# Patient Record
Sex: FEMALE | Race: WHITE | NOT HISPANIC OR LATINO | URBAN - METROPOLITAN AREA
[De-identification: names, ages, dates, MRNs, and addresses within clinical notes are randomized per-mention and may not be internally consistent; named-entity substitution may affect disease eponyms.]

---

## 2023-10-24 ENCOUNTER — TELEPHONE (OUTPATIENT)
Dept: PLASTIC SURGERY | Facility: CLINIC | Age: 63
End: 2023-10-24
Payer: COMMERCIAL

## 2023-10-26 NOTE — TELEPHONE ENCOUNTER
Called pt today to discuss appt for lipedema. Pt did not answer but I was able to leave a VM with my direct call back number to discuss.  Luke MITTAL RN

## 2023-11-02 NOTE — TELEPHONE ENCOUNTER
FUTURE VISIT INFORMATION      FUTURE VISIT INFORMATION:  Date: 1/3/24  Time: 11:00am  [place] [facility] [place]  REFERRAL INFORMATION:  Referring provider:  Dr Kianna Bledsoe   Referring providers clinic:  [facility]  Reason for visit/diagnosis  Discuss lipedema     RECORDS REQUESTED FROM:       Clinic name Comments Records Status Imaging Status   [facility] Telemedicine visit/referral 10/24/23 EPIC

## 2024-01-03 ENCOUNTER — TELEPHONE (OUTPATIENT)
Dept: PLASTIC SURGERY | Facility: CLINIC | Age: 64
End: 2024-01-03

## 2024-01-03 ENCOUNTER — OFFICE VISIT (OUTPATIENT)
Dept: PLASTIC SURGERY | Facility: CLINIC | Age: 64
End: 2024-01-03
Payer: COMMERCIAL

## 2024-01-03 ENCOUNTER — PRE VISIT (OUTPATIENT)
Dept: PLASTIC SURGERY | Facility: CLINIC | Age: 64
End: 2024-01-03

## 2024-01-03 VITALS
OXYGEN SATURATION: 96 % | WEIGHT: 194.3 LBS | HEIGHT: 66 IN | SYSTOLIC BLOOD PRESSURE: 151 MMHG | DIASTOLIC BLOOD PRESSURE: 85 MMHG | HEART RATE: 71 BPM | BODY MASS INDEX: 31.23 KG/M2

## 2024-01-03 DIAGNOSIS — R60.9 LIPEDEMA: Primary | ICD-10-CM

## 2024-01-03 PROCEDURE — 99203 OFFICE O/P NEW LOW 30 MIN: CPT | Performed by: STUDENT IN AN ORGANIZED HEALTH CARE EDUCATION/TRAINING PROGRAM

## 2024-01-03 RX ORDER — DIPHENOXYLATE HYDROCHLORIDE AND ATROPINE SULFATE 2.5; .025 MG/1; MG/1
1 TABLET ORAL DAILY
COMMUNITY

## 2024-01-03 RX ORDER — TRAZODONE HYDROCHLORIDE 50 MG/1
50-100 TABLET, FILM COATED ORAL
COMMUNITY
Start: 2023-10-24

## 2024-01-03 RX ORDER — MIRABEGRON 25 MG/1
1 TABLET, FILM COATED, EXTENDED RELEASE ORAL DAILY
COMMUNITY
Start: 2023-12-13 | End: 2024-08-06

## 2024-01-03 RX ORDER — BUPROPION HYDROCHLORIDE 150 MG/1
1 TABLET ORAL DAILY
COMMUNITY
Start: 2023-02-21 | End: 2024-08-13

## 2024-01-03 RX ORDER — HYDROCHLOROTHIAZIDE 12.5 MG/1
CAPSULE ORAL
COMMUNITY
Start: 2023-03-07 | End: 2024-08-06

## 2024-01-03 RX ORDER — LOSARTAN POTASSIUM AND HYDROCHLOROTHIAZIDE 12.5; 5 MG/1; MG/1
1 TABLET ORAL DAILY
COMMUNITY
Start: 2023-03-06 | End: 2024-08-13

## 2024-01-03 ASSESSMENT — PAIN SCALES - GENERAL: PAINLEVEL: NO PAIN (0)

## 2024-01-03 NOTE — LETTER
"1/3/2024       RE: Waleska Taylor  [address]  [place] [address]     Dear Colleague,    Thank you for referring your patient, Waleska Taylor, to the [facility] PLASTIC AND RECONSTRUCTIVE SURGERY CLINIC [place] at [facility]. Please see a copy of my visit note below.    PRS    HPI: 63-year-old female presenting with bilateral lower extremity lipedema.  Patient has always had heaviness and achiness to bilateral lower extremities below the knees.  She has tried elevation, compression stockings, and most recently lymphatic pneumatic compression boots.  She was diagnosed with lipedema by a dermatologist on May 2023.  Since then, she has tried the static compression stockings with little to no relief.  Over the last month, patient has tried the lymphatic pneumatic compression boots, which she uses for 30 minutes/day.  She states that this seems to be helping, but it is still very early.  Ultrasound imaging reveals no DVT.  Patient had varicose vein ablative procedure 15+ years ago.  No lymphatic imaging.    ROS: Negative, see HPI  Past medical history: Nondiabetic  Past surgical history: As above  Medications: No blood thinners  Allergies: None  Family history: No bleeding or clotting problems, issues with anesthesia  Social history: Non-smoker, denies any tobacco or nicotine use    Examination:  BP (!) 151/85 (BP Location: Left arm, Patient Position: Sitting, Cuff Size: Adult Regular)   Pulse 71   Ht 1.676 m (5' 6\")   Wt 88.1 kg (194 lb 4.8 oz)   SpO2 96%   BMI 31.36 kg/m    Nonlabored breathing  Not distressed  Bilateral below the knee lipodystrophy, varicosity, and some edema that is nonpitting  No bilateral calf tenderness  Minimal edema of the thighs     A/P: 63-year-old female presenting with bilateral lower extremity primarily below the knee lipodystrophy versus lipedema    -Discussed the options for management.  Since the " patient has just tried lymphatic pneumatic compression boots with some relief, my suggestion would be to continue this conservative treatment for at least another 3-6 months to truly evaluate its efficacy.  Additionally, patient should inquire with insurance regarding possibility of coverage for lipedema.  Discussed possibility of performing liposuction.  Explained that I have some experience with this with patient's expressing relief after liposuction.  Patient is agreeable to plan.  -Return to clinic after at least 3-6 months of additional conservative treatment  -A total of 45 minutes was devoted to review of chart, direct face-to-face patient counseling and documentation during this encounter, exclusive of any procedure performed.        Again, thank you for allowing me to participate in the care of your patient.      Sincerely,    Salvador Gonzalez MD

## 2024-01-03 NOTE — NURSING NOTE
"Chief Complaint   Patient presents with    Consult     Discuss lipedema, per patient.       Vitals:    01/03/24 1354   BP: (!) 151/85   BP Location: Left arm   Patient Position: Sitting   Cuff Size: Adult Regular   Pulse: 71   SpO2: 96%   Weight: 194 lb 4.8 oz   Height: 5' 6\"       Body mass index is 31.36 kg/m .    Merlin Larios, EMT    "

## 2024-01-03 NOTE — TELEPHONE ENCOUNTER
[facility] Call Center    Phone Message    May a detailed message be left on voicemail: yes     Reason for Call: Other: Please call patient back to discuss paperwork needed in order for insurance to cover the cost of surgery.     Action Taken: Message routed to:  [facility]     Travel Screening: Not Applicable

## 2024-01-03 NOTE — PROGRESS NOTES
"PRS    HPI: 63-year-old female presenting with bilateral lower extremity lipedema.  Patient has always had heaviness and achiness to bilateral lower extremities below the knees.  She has tried elevation, compression stockings, and most recently lymphatic pneumatic compression boots.  She was diagnosed with lipedema by a dermatologist on May 2023.  Since then, she has tried the static compression stockings with little to no relief.  Over the last month, patient has tried the lymphatic pneumatic compression boots, which she uses for 30 minutes/day.  She states that this seems to be helping, but it is still very early.  Ultrasound imaging reveals no DVT.  Patient had varicose vein ablative procedure 15+ years ago.  No lymphatic imaging.    ROS: Negative, see HPI  Past medical history: Nondiabetic  Past surgical history: As above  Medications: No blood thinners  Allergies: None  Family history: No bleeding or clotting problems, issues with anesthesia  Social history: Non-smoker, denies any tobacco or nicotine use    Examination:  BP (!) 151/85 (BP Location: Left arm, Patient Position: Sitting, Cuff Size: Adult Regular)   Pulse 71   Ht 1.676 m (5' 6\")   Wt 88.1 kg (194 lb 4.8 oz)   SpO2 96%   BMI 31.36 kg/m    Nonlabored breathing  Not distressed  Bilateral below the knee lipodystrophy, varicosity, and some edema that is nonpitting  No bilateral calf tenderness  Minimal edema of the thighs     A/P: 63-year-old female presenting with bilateral lower extremity primarily below the knee lipodystrophy versus lipedema    -Discussed the options for management.  Since the patient has just tried lymphatic pneumatic compression boots with some relief, my suggestion would be to continue this conservative treatment for at least another 3-6 months to truly evaluate its efficacy.  Additionally, patient should inquire with insurance regarding possibility of coverage for lipedema.  Discussed possibility of performing liposuction.  " Explained that I have some experience with this with patient's expressing relief after liposuction.  Patient is agreeable to plan.  -Return to clinic after at least 3-6 months of additional conservative treatment  -A total of 45 minutes was devoted to review of chart, direct face-to-face patient counseling and documentation during this encounter, exclusive of any procedure performed.    Salvador Gonzalez MD, PhD

## 2024-01-05 NOTE — TELEPHONE ENCOUNTER
Returned pt's call today to discuss needed paperwork. Pt did not answer but I was able to leave a voicemail with my direct call back number.  Luke MITTAL RN

## 2024-01-28 ENCOUNTER — HEALTH MAINTENANCE LETTER (OUTPATIENT)
Age: 64
End: 2024-01-28

## 2024-03-27 ENCOUNTER — OFFICE VISIT (OUTPATIENT)
Dept: PLASTIC SURGERY | Facility: CLINIC | Age: 64
End: 2024-03-27
Payer: COMMERCIAL

## 2024-03-27 VITALS
DIASTOLIC BLOOD PRESSURE: 63 MMHG | BODY MASS INDEX: 31.16 KG/M2 | WEIGHT: 193.9 LBS | SYSTOLIC BLOOD PRESSURE: 117 MMHG | HEIGHT: 66 IN | HEART RATE: 79 BPM | OXYGEN SATURATION: 99 %

## 2024-03-27 DIAGNOSIS — R60.9 LIPEDEMA: Primary | ICD-10-CM

## 2024-03-27 PROCEDURE — 99213 OFFICE O/P EST LOW 20 MIN: CPT | Performed by: STUDENT IN AN ORGANIZED HEALTH CARE EDUCATION/TRAINING PROGRAM

## 2024-03-27 RX ORDER — ENOXAPARIN SODIUM 100 MG/ML
40 INJECTION SUBCUTANEOUS
Status: CANCELLED | OUTPATIENT
Start: 2024-03-27

## 2024-03-27 RX ORDER — CEFAZOLIN SODIUM 2 G/50ML
2 SOLUTION INTRAVENOUS SEE ADMIN INSTRUCTIONS
Status: CANCELLED | OUTPATIENT
Start: 2024-03-27

## 2024-03-27 RX ORDER — CEFAZOLIN SODIUM 2 G/50ML
2 SOLUTION INTRAVENOUS
Status: CANCELLED | OUTPATIENT
Start: 2024-03-27

## 2024-03-27 ASSESSMENT — PAIN SCALES - GENERAL: PAINLEVEL: NO PAIN (0)

## 2024-03-27 NOTE — LETTER
3/27/2024       RE: Waleska Taylor  [address] [address]     Dear Colleague,    Thank you for referring your patient, Waleska Taylor, to the [facility] PLASTIC AND RECONSTRUCTIVE SURGERY CLINIC [place] at [facility]. Please see a copy of my visit note below.    PRS    Patient returns after additional trial of conservative management with decongestive compression therapy and compression stockings with no relief. She sought additional evaluation from internist and has more documentation to support medical necessity treatment of lipedema. She would like surgery.     Discussed the limitations of liposuction. First, the telangiectasias will not be treated by liposuction. This could be treated with laser or light therapies. Second, liposuction may cause excess skin necessitating possible future elective excisional procedure like medial thigh lift. Third, liposuction requires wetting fluid infiltration and may disrupt veins and/or lymphatics, so it may cause prolonged swelling or even worse lipedema. However, I have done this on one patient for lipedema and it has helped her with heaviness symptoms characteristic of lipedema. We would plan to start prone and then flip supine.     Discussed the risks of surgery, including but not limited to: infection, bleeding, pain, poor scarring, prolonged or worse swelling, incomplete relief of symptoms, injury to deeper structures, neuroma formation, foot drop, contour irregularity, asymmetries, suboptimal aesthetic result, anesthesia-related complication, DVT/PE, death. Despite these risks, patient consents to BILATERAL circumferential lower extremity liposuction for lipedema. All questions answered.     Case request placed. Patient understands the postoperative protocol of compression for 4 weeks and low salt diet. No cardio for 2 weeks. She can return to work in 1-2 weeks.     A total of 20 minutes was  devoted to review of chart, direct face-to-face patient counseling and documentation during this encounter, exclusive of any procedure performed.          Again, thank you for allowing me to participate in the care of your patient.      Sincerely,    Salvador Gonzalez MD

## 2024-03-27 NOTE — NURSING NOTE
"Chief Complaint   Patient presents with    RECHECK     Follow-up, lipedema.       Vitals:    03/27/24 1314   BP: 117/63   BP Location: Left arm   Patient Position: Sitting   Cuff Size: Adult Large   Pulse: 79   SpO2: 99%   Weight: 88 kg (193 lb 14.4 oz)   Height: 1.676 m (5' 6\")       Body mass index is 31.3 kg/m .    Merlin Larios, EMT    "

## 2024-03-27 NOTE — PROGRESS NOTES
PRS    Patient returns after additional trial of conservative management with decongestive compression therapy and compression stockings with no relief. She sought additional evaluation from internist and has more documentation to support medical necessity treatment of lipedema. She would like surgery.     Discussed the limitations of liposuction. First, the telangiectasias will not be treated by liposuction. This could be treated with laser or light therapies. Second, liposuction may cause excess skin necessitating possible future elective excisional procedure like medial thigh lift. Third, liposuction requires wetting fluid infiltration and may disrupt veins and/or lymphatics, so it may cause prolonged swelling or even worse lipedema. However, I have done this on one patient for lipedema and it has helped her with heaviness symptoms characteristic of lipedema. We would plan to start prone and then flip supine.     Discussed the risks of surgery, including but not limited to: infection, bleeding, pain, poor scarring, prolonged or worse swelling, incomplete relief of symptoms, injury to deeper structures, neuroma formation, foot drop, contour irregularity, asymmetries, suboptimal aesthetic result, anesthesia-related complication, DVT/PE, death. Despite these risks, patient consents to BILATERAL circumferential lower extremity liposuction for lipedema. All questions answered.     Case request placed. Patient understands the postoperative protocol of compression for 4 weeks and low salt diet. No cardio for 2 weeks. She can return to work in 1-2 weeks.     A total of 20 minutes was devoted to review of chart, direct face-to-face patient counseling and documentation during this encounter, exclusive of any procedure performed.    Salvador Gonzalez MD, PhD

## 2024-04-08 ENCOUNTER — TELEPHONE (OUTPATIENT)
Dept: PLASTIC SURGERY | Facility: CLINIC | Age: 64
End: 2024-04-08
Payer: COMMERCIAL

## 2024-04-08 PROBLEM — R60.9 LIPEDEMA: Status: ACTIVE | Noted: 2024-03-27

## 2024-04-08 NOTE — TELEPHONE ENCOUNTER
Attempted to call patient on 4/8 regarding scheduling surgery with Dr. Gonzalez    Was unable to leave voicemail due to no voicemail option    [employer] sent, provided direct number to discuss  __    Shruthi Newberryhiphil on 4/8/2024 at 2:39 PM  P: [phone]

## 2024-04-08 NOTE — TELEPHONE ENCOUNTER
RN Care Coordinator: Luke Karimi    Surgery is scheduled with Dr. Gonzalez  Date: 8/29   *mid/late August dates offered due to note in chart that patient must wait at least 3 months after breast surgery scheduled on 5/10 outside of the [facility]  [facility]    H&P to be completed by: Primary Care team - patient instructed to schedule   per patient, this will be scheduled with Kianna Bledsoe MD     Surgical consult: Declined - patient does not feel she needs this at this time     Post-op visit: 9/11 with Dr. Gonzalez, [facility]    Patient will receive a phone call from pre-admission nurses 1-2 days prior to surgery with arrival time and NPO instructions.       Spoke with the patient and was able to confirm all scheduled information.       Patient questions/concerns: Patient is requesting surgery on 8/15 or 8/1. Will see if there is a possibility of moving surgery to 8/15. Patient unavailable for first date offered due to work, which was 8/22. Explained that the length of her surgery is 4 hours, which means we need more than 4 hours of time available in the operating room. Also explained that time is assigned to other specialties, so it may not become available until 2 weeks before the date.    Explained financial securing process in detail, including timeframe of which the financial securing team submits surgery to insurance. Also explained what communication she will and will not receive from our team.    Also recommended that ahead of surgery, patient does the following:  Check to see if there is medical policy or specific criteria required by her insurance for this surgery to be covered       Surgery packet: to be sent via [place] mail    __    Shruthi Dent on 4/8/2024 at 4:50 PM  P: [phone]

## 2024-06-07 ENCOUNTER — TELEPHONE (OUTPATIENT)
Dept: PLASTIC SURGERY | Facility: CLINIC | Age: 64
End: 2024-06-07
Payer: COMMERCIAL

## 2024-06-07 NOTE — TELEPHONE ENCOUNTER
Left message regarding scheduling surgery/procedure with Dr. Gonzalez. Writer left call back number on the patients voicemail.      Shruthi Velasco on 6/7/2024 at 11:09 AM   P: [phone]

## 2024-06-10 NOTE — TELEPHONE ENCOUNTER
Left message regarding scheduling surgery/procedure with Dr. Gonzalez. Writer left call back number on the patients voicemail.      Shruthi Velasco on 6/10/2024 at 10:39 AM   P: [phone]

## 2024-06-11 ENCOUNTER — MYC MEDICAL ADVICE (OUTPATIENT)
Dept: PLASTIC SURGERY | Facility: CLINIC | Age: 64
End: 2024-06-11
Payer: COMMERCIAL

## 2024-06-11 NOTE — TELEPHONE ENCOUNTER
Attempted to contact patient regarding upcoming surgery w/ Dr Gonzalez. Patients voicemail box is full unable to leave Jose Velasco on 6/11/2024 at 4:19 PM

## 2024-06-12 NOTE — TELEPHONE ENCOUNTER
Called patient to reschedule surgery with Dr. Gonzalez. Informed patient that Dr. Gonzalez is not available on 8/29/2024. Offered patient surgery on 8/13/2024 patient states that she works on Tuesday's and requesting a Thursday, offered patient October. Informed patient of available date of 7/29/2024, due to cancellation.    Patient agreed to 7/29/2024 at the [facility] [place]. Rescheduled post-op appt to 8/7/2024 in plastics surgery clinic for post-op.      All patients questions were answered and was instructed to call back with any questions.       Shruthi Velasco on 6/12/2024 at 3:32 PM

## 2024-06-17 NOTE — TELEPHONE ENCOUNTER
Left message regarding scheduled  surgery with Dr. Gonzalez. Writer left call back number on the patients voicemail.    Shruthi Velasco on 6/17/2024 at 9:42 AM   P: [phone]

## 2024-06-17 NOTE — TELEPHONE ENCOUNTER
Spoke with patient    Rescheduled surgery per Dr. Gonzalez due to timeframe after mastopexy    Surgery is rescheduled with Dr. Gonzalez   Date: 8/13   [facility]    H&P to be completed by: Primary Care team - patient instructed to schedule   per patient, this will be scheduled with Kianna Bledsoe MD        Post-op: rescheduled to 8/29 with Zoila Westbrook PA-C    Patient will receive a phone call from surgery center pre-operative nurses 3-5 days prior to surgery with arrival time and NPO instructions.     Patient questions/concerns: N/A     Surgery packet: sent previously - new surgery packet not needed per patient    __    Shruthi Newberryhiphil on 6/17/2024 at 2:03 PM  P: [phone]

## 2024-08-06 RX ORDER — ASPIRIN 81 MG/1
81 TABLET ORAL DAILY
COMMUNITY

## 2024-08-12 ENCOUNTER — ANESTHESIA EVENT (OUTPATIENT)
Dept: SURGERY | Facility: AMBULATORY SURGERY CENTER | Age: 64
End: 2024-08-12
Payer: COMMERCIAL

## 2024-08-12 ASSESSMENT — ENCOUNTER SYMPTOMS: DYSRHYTHMIAS: 1

## 2024-08-12 NOTE — ANESTHESIA PREPROCEDURE EVALUATION
Anesthesia Pre-Procedure Evaluation    Patient: Wlaeska Taylor   MRN: [id number] : 1960        Procedure : Procedure(s):  BILATERAL circumferential lower extremity liposuction for lipedema          Past Medical History:   Diagnosis Date    Depressive disorder, not elsewhere classified     Headache(784.0)     Obesity, unspecified       Past Surgical History:   Procedure Laterality Date    NO HISTORY OF SURGERY        Allergies   Allergen Reactions    No Known Drug Allergy       Social History     Tobacco Use    Smoking status: Never    Smokeless tobacco: Never   Substance Use Topics    Alcohol use: Yes     Comment: social      Wt Readings from Last 1 Encounters:   24 88 kg (193 lb 14.4 oz)        Anesthesia Evaluation   Pt has had prior anesthetic.     History of anesthetic complications  - motion sickness.      ROS/MED HX  ENT/Pulmonary:  - neg pulmonary ROS     Neurologic:  - neg neurologic ROS     Cardiovascular:     (+)  hypertension- -   -  - -                        dysrhythmias (LBBB),           (-) murmur   METS/Exercise Tolerance: >4 METS    Hematologic:  - neg hematologic  ROS     Musculoskeletal:  - neg musculoskeletal ROS     GI/Hepatic:  - neg GI/hepatic ROS     Renal/Genitourinary:  - neg Renal ROS     Endo:     (+)               Obesity,       Psychiatric/Substance Use:     (+) psychiatric history depression       Infectious Disease:  - neg infectious disease ROS     Malignancy:  - neg malignancy ROS     Other:  - neg other ROS          Physical Exam    Airway        Mallampati: I   TM distance: > 3 FB   Neck ROM: full   Mouth opening: > 3 cm    Respiratory Devices and Support         Dental     Comment: Teeth examined without any significant abnormality, patient denies loose, missing or chipped teeth or anything removable.         Cardiovascular          Rhythm and rate: regular and normal (-) no murmur    Pulmonary   pulmonary exam normal        breath sounds clear to auscultation  "          OUTSIDE LABS:  CBC:   Lab Results   Component Value Date    WBC 7.5 08/15/2007    WBC 7.50 05/14/2001    HGB 10.7 (L) 08/15/2007    HGB 13.4 12/14/2001    HCT 32.2 (L) 08/15/2007    HCT 39.0 05/14/2001     08/15/2007     05/14/2001     BMP:   Lab Results   Component Value Date     08/15/2007    POTASSIUM 4.1 08/15/2007    CHLORIDE 104 08/15/2007    CO2 30 08/15/2007    BUN 9 08/15/2007    CR 0.69 08/15/2007    GLC 92 08/15/2007     COAGS: No results found for: \"PTT\", \"INR\", \"FIBR\"  POC: No results found for: \"BGM\", \"HCG\", \"HCGS\"  HEPATIC:   Lab Results   Component Value Date    ALBUMIN 4.0 08/15/2007    PROTTOTAL 7.3 08/15/2007    ALT 21 08/15/2007    AST 18 08/15/2007    ALKPHOS 76 08/15/2007    BILITOTAL 0.2 08/15/2007     OTHER:   Lab Results   Component Value Date    CHIAK 9.2 08/15/2007    TSH 0.52 08/15/2007       Anesthesia Plan    ASA Status:  2    NPO Status:  NPO Appropriate    Anesthesia Type: General.     - Airway: ETT   Induction: Intravenous, Propofol.   Maintenance: Balanced.        Consents    Anesthesia Plan(s) and associated risks, benefits, and realistic alternatives discussed. Questions answered and patient/representative(s) expressed understanding.     - Discussed:     - Discussed with:  Patient      - Extended Intubation/Ventilatory Support Discussed: No.      - Patient is DNR/DNI Status: No     Use of blood products discussed: No .     Postoperative Care    Pain management: IV analgesics, Oral pain medications, Multi-modal analgesia.   PONV prophylaxis: Ondansetron (or other 5HT-3), Dexamethasone or Solumedrol, Background Propofol Infusion     Comments:    Other Comments: Discussed plan for general anesthetic with Oral ETT. Discussed risks of sore throat, post op pain/nausea, oropharyngeal damage, rare major complications.      Motion sickness but denies nausea from past anesthetics.            Yaron Fernández MD    I have reviewed the pertinent notes and labs in " the chart from the past 30 days and (re)examined the patient.  Any updates or changes from those notes are reflected in this note.             # Drug Induced Platelet Defect: home medication list includes an antiplatelet medication

## 2024-08-13 ENCOUNTER — ANESTHESIA (OUTPATIENT)
Dept: SURGERY | Facility: AMBULATORY SURGERY CENTER | Age: 64
End: 2024-08-13
Payer: COMMERCIAL

## 2024-08-13 ENCOUNTER — HOSPITAL ENCOUNTER (OUTPATIENT)
Facility: AMBULATORY SURGERY CENTER | Age: 64
Discharge: HOME | End: 2024-08-13
Attending: STUDENT IN AN ORGANIZED HEALTH CARE EDUCATION/TRAINING PROGRAM
Payer: COMMERCIAL

## 2024-08-13 VITALS
DIASTOLIC BLOOD PRESSURE: 84 MMHG | RESPIRATION RATE: 16 BRPM | WEIGHT: 188 LBS | HEART RATE: 52 BPM | TEMPERATURE: 96.8 F | HEIGHT: 66 IN | SYSTOLIC BLOOD PRESSURE: 125 MMHG | OXYGEN SATURATION: 98 % | BODY MASS INDEX: 30.22 KG/M2

## 2024-08-13 DIAGNOSIS — R60.9 LIPEDEMA: Primary | ICD-10-CM

## 2024-08-13 PROCEDURE — 15879 SUCTION LIPECTOMY LWR EXTREM: CPT | Mod: 50 | Performed by: STUDENT IN AN ORGANIZED HEALTH CARE EDUCATION/TRAINING PROGRAM

## 2024-08-13 PROCEDURE — 15879 SUCTION LIPECTOMY LWR EXTREM: CPT | Performed by: NURSE ANESTHETIST, CERTIFIED REGISTERED

## 2024-08-13 PROCEDURE — 15879 SUCTION LIPECTOMY LWR EXTREM: CPT | Performed by: ANESTHESIOLOGY

## 2024-08-13 PROCEDURE — 15879 SUCTION LIPECTOMY LWR EXTREM: CPT | Mod: LT

## 2024-08-13 RX ORDER — ONDANSETRON 4 MG/1
4 TABLET, ORALLY DISINTEGRATING ORAL EVERY 30 MIN PRN
Status: DISCONTINUED | OUTPATIENT
Start: 2024-08-13 | End: 2024-08-14 | Stop reason: HOSPADM

## 2024-08-13 RX ORDER — CEFAZOLIN SODIUM 2 G/50ML
2 SOLUTION INTRAVENOUS
Status: COMPLETED | OUTPATIENT
Start: 2024-08-13 | End: 2024-08-13

## 2024-08-13 RX ORDER — OXYCODONE HYDROCHLORIDE 5 MG/1
10 TABLET ORAL
Status: DISCONTINUED | OUTPATIENT
Start: 2024-08-13 | End: 2024-08-14 | Stop reason: HOSPADM

## 2024-08-13 RX ORDER — SODIUM CHLORIDE, SODIUM LACTATE, POTASSIUM CHLORIDE, CALCIUM CHLORIDE 600; 310; 30; 20 MG/100ML; MG/100ML; MG/100ML; MG/100ML
INJECTION, SOLUTION INTRAVENOUS CONTINUOUS
Status: DISCONTINUED | OUTPATIENT
Start: 2024-08-13 | End: 2024-08-14 | Stop reason: HOSPADM

## 2024-08-13 RX ORDER — FENTANYL CITRATE 50 UG/ML
25 INJECTION, SOLUTION INTRAMUSCULAR; INTRAVENOUS
Status: DISCONTINUED | OUTPATIENT
Start: 2024-08-13 | End: 2024-08-14 | Stop reason: HOSPADM

## 2024-08-13 RX ORDER — ONDANSETRON 2 MG/ML
4 INJECTION INTRAMUSCULAR; INTRAVENOUS EVERY 30 MIN PRN
Status: DISCONTINUED | OUTPATIENT
Start: 2024-08-13 | End: 2024-08-14 | Stop reason: HOSPADM

## 2024-08-13 RX ORDER — PROPOFOL 10 MG/ML
INJECTION, EMULSION INTRAVENOUS CONTINUOUS PRN
Status: DISCONTINUED | OUTPATIENT
Start: 2024-08-13 | End: 2024-08-13

## 2024-08-13 RX ORDER — DEXAMETHASONE SODIUM PHOSPHATE 10 MG/ML
4 INJECTION, SOLUTION INTRAMUSCULAR; INTRAVENOUS
Status: DISCONTINUED | OUTPATIENT
Start: 2024-08-13 | End: 2024-08-14 | Stop reason: HOSPADM

## 2024-08-13 RX ORDER — ONDANSETRON 2 MG/ML
INJECTION INTRAMUSCULAR; INTRAVENOUS PRN
Status: DISCONTINUED | OUTPATIENT
Start: 2024-08-13 | End: 2024-08-13

## 2024-08-13 RX ORDER — DEXMEDETOMIDINE HYDROCHLORIDE 4 UG/ML
INJECTION, SOLUTION INTRAVENOUS CONTINUOUS PRN
Status: DISCONTINUED | OUTPATIENT
Start: 2024-08-13 | End: 2024-08-13

## 2024-08-13 RX ORDER — LABETALOL HYDROCHLORIDE 5 MG/ML
10 INJECTION, SOLUTION INTRAVENOUS
Status: DISCONTINUED | OUTPATIENT
Start: 2024-08-13 | End: 2024-08-14 | Stop reason: HOSPADM

## 2024-08-13 RX ORDER — ACETAMINOPHEN 325 MG/1
975 TABLET ORAL ONCE
Status: COMPLETED | OUTPATIENT
Start: 2024-08-13 | End: 2024-08-13

## 2024-08-13 RX ORDER — FENTANYL CITRATE 50 UG/ML
25 INJECTION, SOLUTION INTRAMUSCULAR; INTRAVENOUS EVERY 5 MIN PRN
Status: DISCONTINUED | OUTPATIENT
Start: 2024-08-13 | End: 2024-08-14 | Stop reason: HOSPADM

## 2024-08-13 RX ORDER — FENTANYL CITRATE 50 UG/ML
INJECTION, SOLUTION INTRAMUSCULAR; INTRAVENOUS PRN
Status: DISCONTINUED | OUTPATIENT
Start: 2024-08-13 | End: 2024-08-13

## 2024-08-13 RX ORDER — SCOLOPAMINE TRANSDERMAL SYSTEM 1 MG/1
1 PATCH, EXTENDED RELEASE TRANSDERMAL ONCE
Status: CANCELLED | OUTPATIENT
Start: 2024-08-13 | End: 2024-08-13

## 2024-08-13 RX ORDER — LIDOCAINE 40 MG/G
CREAM TOPICAL
Status: DISCONTINUED | OUTPATIENT
Start: 2024-08-13 | End: 2024-08-14 | Stop reason: HOSPADM

## 2024-08-13 RX ORDER — NALOXONE HYDROCHLORIDE 0.4 MG/ML
0.1 INJECTION, SOLUTION INTRAMUSCULAR; INTRAVENOUS; SUBCUTANEOUS
Status: DISCONTINUED | OUTPATIENT
Start: 2024-08-13 | End: 2024-08-14 | Stop reason: HOSPADM

## 2024-08-13 RX ORDER — FENTANYL CITRATE 50 UG/ML
50 INJECTION, SOLUTION INTRAMUSCULAR; INTRAVENOUS EVERY 5 MIN PRN
Status: DISCONTINUED | OUTPATIENT
Start: 2024-08-13 | End: 2024-08-14 | Stop reason: HOSPADM

## 2024-08-13 RX ORDER — CEPHALEXIN 500 MG/1
500 CAPSULE ORAL 4 TIMES DAILY
Qty: 12 CAPSULE | Refills: 0 | Status: SHIPPED | OUTPATIENT
Start: 2024-08-13

## 2024-08-13 RX ORDER — CEFAZOLIN SODIUM 2 G/50ML
2 SOLUTION INTRAVENOUS SEE ADMIN INSTRUCTIONS
Status: DISCONTINUED | OUTPATIENT
Start: 2024-08-13 | End: 2024-08-14 | Stop reason: HOSPADM

## 2024-08-13 RX ORDER — OXYCODONE HYDROCHLORIDE 5 MG/1
5 TABLET ORAL EVERY 6 HOURS PRN
Qty: 12 TABLET | Refills: 0 | Status: SHIPPED | OUTPATIENT
Start: 2024-08-13 | End: 2024-08-19

## 2024-08-13 RX ORDER — ONDANSETRON 4 MG/1
4 TABLET, FILM COATED ORAL EVERY 6 HOURS PRN
Qty: 12 TABLET | Refills: 0 | Status: SHIPPED | OUTPATIENT
Start: 2024-08-13

## 2024-08-13 RX ORDER — HYDROMORPHONE HYDROCHLORIDE 1 MG/ML
0.4 INJECTION, SOLUTION INTRAMUSCULAR; INTRAVENOUS; SUBCUTANEOUS EVERY 5 MIN PRN
Status: DISCONTINUED | OUTPATIENT
Start: 2024-08-13 | End: 2024-08-14 | Stop reason: HOSPADM

## 2024-08-13 RX ORDER — DEXAMETHASONE SODIUM PHOSPHATE 4 MG/ML
INJECTION, SOLUTION INTRA-ARTICULAR; INTRALESIONAL; INTRAMUSCULAR; INTRAVENOUS; SOFT TISSUE PRN
Status: DISCONTINUED | OUTPATIENT
Start: 2024-08-13 | End: 2024-08-13

## 2024-08-13 RX ORDER — SODIUM CHLORIDE, SODIUM LACTATE, POTASSIUM CHLORIDE, CALCIUM CHLORIDE 600; 310; 30; 20 MG/100ML; MG/100ML; MG/100ML; MG/100ML
INJECTION, SOLUTION INTRAVENOUS CONTINUOUS PRN
Status: DISCONTINUED | OUTPATIENT
Start: 2024-08-13 | End: 2024-08-13

## 2024-08-13 RX ORDER — OXYCODONE HYDROCHLORIDE 5 MG/1
5 TABLET ORAL
Status: COMPLETED | OUTPATIENT
Start: 2024-08-13 | End: 2024-08-13

## 2024-08-13 RX ORDER — PROPOFOL 10 MG/ML
INJECTION, EMULSION INTRAVENOUS PRN
Status: DISCONTINUED | OUTPATIENT
Start: 2024-08-13 | End: 2024-08-13

## 2024-08-13 RX ORDER — HYDROMORPHONE HYDROCHLORIDE 1 MG/ML
0.2 INJECTION, SOLUTION INTRAMUSCULAR; INTRAVENOUS; SUBCUTANEOUS EVERY 5 MIN PRN
Status: DISCONTINUED | OUTPATIENT
Start: 2024-08-13 | End: 2024-08-14 | Stop reason: HOSPADM

## 2024-08-13 RX ORDER — METHOCARBAMOL 500 MG/1
500 TABLET, FILM COATED ORAL 4 TIMES DAILY
Qty: 12 TABLET | Refills: 0 | Status: SHIPPED | OUTPATIENT
Start: 2024-08-13 | End: 2024-08-19

## 2024-08-13 RX ORDER — MEPERIDINE HYDROCHLORIDE 25 MG/ML
12.5 INJECTION INTRAMUSCULAR; INTRAVENOUS; SUBCUTANEOUS EVERY 5 MIN PRN
Status: DISCONTINUED | OUTPATIENT
Start: 2024-08-13 | End: 2024-08-14 | Stop reason: HOSPADM

## 2024-08-13 RX ORDER — ENOXAPARIN SODIUM 100 MG/ML
40 INJECTION SUBCUTANEOUS
Status: COMPLETED | OUTPATIENT
Start: 2024-08-13 | End: 2024-08-13

## 2024-08-13 RX ORDER — LIDOCAINE HYDROCHLORIDE 20 MG/ML
INJECTION, SOLUTION INFILTRATION; PERINEURAL PRN
Status: DISCONTINUED | OUTPATIENT
Start: 2024-08-13 | End: 2024-08-13

## 2024-08-13 RX ADMIN — Medication 0.5 MG: at 12:25

## 2024-08-13 RX ADMIN — ONDANSETRON 4 MG: 2 INJECTION INTRAMUSCULAR; INTRAVENOUS at 15:07

## 2024-08-13 RX ADMIN — ACETAMINOPHEN 975 MG: 325 TABLET ORAL at 10:57

## 2024-08-13 RX ADMIN — ENOXAPARIN SODIUM 40 MG: 100 INJECTION SUBCUTANEOUS at 10:55

## 2024-08-13 RX ADMIN — LIDOCAINE HYDROCHLORIDE 100 MG: 20 INJECTION, SOLUTION INFILTRATION; PERINEURAL at 11:42

## 2024-08-13 RX ADMIN — Medication 0.5 MG: at 14:21

## 2024-08-13 RX ADMIN — DEXMEDETOMIDINE HYDROCHLORIDE 0.5 MCG/KG/HR: 4 INJECTION, SOLUTION INTRAVENOUS at 12:47

## 2024-08-13 RX ADMIN — DEXAMETHASONE SODIUM PHOSPHATE 4 MG: 4 INJECTION, SOLUTION INTRA-ARTICULAR; INTRALESIONAL; INTRAMUSCULAR; INTRAVENOUS; SOFT TISSUE at 12:11

## 2024-08-13 RX ADMIN — SODIUM CHLORIDE, SODIUM LACTATE, POTASSIUM CHLORIDE, CALCIUM CHLORIDE: 600; 310; 30; 20 INJECTION, SOLUTION INTRAVENOUS at 13:30

## 2024-08-13 RX ADMIN — Medication 20 MG: at 13:30

## 2024-08-13 RX ADMIN — PROPOFOL 150 MCG/KG/MIN: 10 INJECTION, EMULSION INTRAVENOUS at 11:42

## 2024-08-13 RX ADMIN — FENTANYL CITRATE 50 MCG: 50 INJECTION, SOLUTION INTRAMUSCULAR; INTRAVENOUS at 11:41

## 2024-08-13 RX ADMIN — CEFAZOLIN SODIUM 2 G: 2 SOLUTION INTRAVENOUS at 11:35

## 2024-08-13 RX ADMIN — OXYCODONE HYDROCHLORIDE 5 MG: 5 TABLET ORAL at 15:38

## 2024-08-13 RX ADMIN — HYDROMORPHONE HYDROCHLORIDE 0.2 MG: 1 INJECTION, SOLUTION INTRAMUSCULAR; INTRAVENOUS; SUBCUTANEOUS at 15:50

## 2024-08-13 RX ADMIN — SODIUM CHLORIDE, SODIUM LACTATE, POTASSIUM CHLORIDE, CALCIUM CHLORIDE: 600; 310; 30; 20 INJECTION, SOLUTION INTRAVENOUS at 12:20

## 2024-08-13 RX ADMIN — Medication 50 MG: at 11:42

## 2024-08-13 RX ADMIN — FENTANYL CITRATE 50 MCG: 50 INJECTION, SOLUTION INTRAMUSCULAR; INTRAVENOUS at 15:43

## 2024-08-13 RX ADMIN — SODIUM CHLORIDE, SODIUM LACTATE, POTASSIUM CHLORIDE, CALCIUM CHLORIDE: 600; 310; 30; 20 INJECTION, SOLUTION INTRAVENOUS at 11:37

## 2024-08-13 RX ADMIN — PROPOFOL 250 MG: 10 INJECTION, EMULSION INTRAVENOUS at 11:42

## 2024-08-13 NOTE — ANESTHESIA CARE TRANSFER NOTE
Patient: Waleska Taylor    Procedure: Procedure(s):  BILATERAL circumferential lower extremity liposuction for lipedema       Diagnosis: Lipedema [R60.9]  Diagnosis Additional Information: No value filed.    Anesthesia Type:   General     Note:    Oropharynx: spontaneously breathing  Level of Consciousness: drowsy  Oxygen Supplementation: room air    Independent Airway: airway patency satisfactory and stable  Dentition: dentition unchanged  Vital Signs Stable: post-procedure vital signs reviewed and stable  Report to RN Given: handoff report given  Patient transferred to: PACU  Comments: Resps easy and regular. Report to PACU RN  Handoff Report: Identifed the Patient, Identified the Reponsible Provider, Reviewed the pertinent medical history, Discussed the surgical course, Reviewed Intra-OP anesthesia mangement and issues during anesthesia, Set expectations for post-procedure period and Allowed opportunity for questions and acknowledgement of understanding      Vitals:  Vitals Value Taken Time   /70 08/13/24 1520   Temp 35.8  C (96.4  F) 08/13/24 1520   Pulse 59 08/13/24 1524   Resp 11 08/13/24 1524   SpO2 95 % 08/13/24 1524   Vitals shown include unfiled device data.    Electronically Signed By: RICK DENNIS CRNA  August 13, 2024  3:25 PM

## 2024-08-13 NOTE — OP NOTE
PLASTIC SURGERY OPERATIVE REPORT     Date of Surgery: 2024  Surgical Service: Plastic Surgery     Preoperative Diagnosis: Bilateral lower extremity lipedema     Postoperative Diagnosis: Same as preoperative diagnosis     Procedures Performed: Bilateral circumferential lower extremity liposuction     Attending:  ESTEBAN Gonzalez MD, PhD  Assistant: GRISELDA Veronica MD     Complications: None apparent  Specimens: None  Implants: None  Estimated blood loss: 50 mL  Tourniquet time: Not applicable  Drains: None  Wound classification: Clean  Anesthesia: General     Indications for Procedure: 63 year-old presenting with lower extremity lipedema refractory to conservative management including compression stocking use, occupational therapy and lymphatic massage, and she elects to undergo bilateral circumferential liposuction.     Discussed the risks of surgery, including but not limited to: Infection, bleeding, pain, poor scarring, injury to deeper structures, neuroma formation, worsening lipedema, lymphedema, worsening symptoms of lipedema, contour irregularity, suboptimal aesthetic result, need for revision or additional procedures, kidney injury, anesthesia related complication, DVT/PE, death. Despite these risks, patient consents to and would like to proceed with bilateral lower extremity circumferential liposuction. Patient understands that this procedure may or may not help her condition, but it is worth trying as it can help.      Intraoperative findings:   Performed super wet technique standard liposuction.  Infiltrated wetting solution containing epinephrine only.   Lipoaspirate volumes:  Left posterior thigh: 600 mL  Right posterior thigh: 600 mL  Left posterior le mL  Right posterior le mL  Left anterior thigh: 1000 mL  Right anterior thigh: 1000 mL  Left anterior le mL  Right anterior le mL  Total:  4700 mL     Description of Procedure: Patient was seen in the preoperative holding area.  Consent  was verified.  The bilateral lower extremities were marked.  All additional questions were answered.  Discussed the risks of surgery, including but not limited to: Infection, bleeding, pain, poor scarring, injury to deeper structures, neuroma formation, worsening lipedema, lymphedema, worsening symptoms of lipedema, contour irregularity, suboptimal aesthetic result, need for revision or additional procedures, kidney injury, anesthesia related complication, DVT/PE, death. Despite these risks, patient consents to and would like to proceed with bilateral lower extremity circumferential liposuction. All questions answered. Patient was then transferred to the operating room and placed supine on the operating table.  All pressure points were padded.  Sequential compression devices were placed on bilateral lower extremities and verified to be operational.  IV antibiotic prophylaxis was given.  Preinduction timeout was performed.  General anesthesia care was commenced.  Baca was placed. Instructed the anesthesia team to monitor urinary output and to supplement approximately 1:1 wetting solution infiltrate to IV fluids, but to titrate to urinary output accordingly. The patient was repositioned into prone and well-padded. Body temperature was maintained with Tereso-Hugger. The bilateral lower extremities was prepped circumferentially and draped in usual sterile fashion. Timeout was done. The same procedure was done on the posterior thigh, posterior leg (calf), anterior thigh, and anterior leg. Using a #11 blade, several skin incisions were made to provide access for cross-ferrell technique subcutaneous liposuction. Once through skin, mosquitoes were use to open the incision slightly to allow wetting solution infiltration. Each area was infiltrated with the same amount as ultimately lipoaspirated (see above). Next, fat separation or pre-tunneling was done with a 3-mm cannula in the subcutaneous space. Next, standard suction  assisted lipoplasty was performed. Care was taken to prevent contour irregularity and to liposuction evenly. An endpoint was when the lipoaspirate was more pink-appearing and the contour was similar between sides. Once the front was done, the incisions were closed with 4-0 Monocryl deep dermals and interrupted 5-0 plain gut suture. The incisions were dressed with skin adhesive. At this point, the drapes were taken down. The patient was repositioned supine and the same procedure was done on the front after standard re-prepping, draping, timeout, wetting solution infiltration. The same skin closure was performed. Once all incisions were sealed with skin adhesive, the drapes were taken down. The urinary output was assessed during the entirety of the case and was consistent and adequate throughout. The bilateral lower extremities were gently wrapped with ACE bandages. The bilateral feet were well perfused at the end off the case following dressing and ACE placement.  The Baca was removed.  Patient tolerated the procedure with no issues and was transferred to the PACU with no events.      Postoperative plan: Clinic in 1-2 weeks. Lower extremity compression for 3-4 weeks.      Salvador Gonzalez MD, Ph

## 2024-08-13 NOTE — DISCHARGE INSTRUCTIONS
You took a dose of Tylenol (Acetaminophen) 975 mg at 11:00 AM.  You can take your next dose of Tylenol at 5:00 PM.   [facility]  Home Care Following Anesthesia  For 24 hours after surgery:  Get plenty of rest.  A responsible adult must stay with you for at least 24 hours after you leave the surgery center.  Do not drive or use heavy equipment.  If you have weakness or tingling, don't drive or use heavy equipment until this feeling goes away.   Do not drink alcohol.   Avoid strenuous or risky activities.  Ask for help when climbing stairs.  You may feel lightheaded.  IF so, sit for a few minutes before standing.  Have someone help you get up.   If you have nausea (feel sick to your stomach): Drink only clear liquids such as apple juice, ginger ale, broth or 7-Up.  Rest may also help.  Be sure to drink enough fluids.  Move to a regular diet as you feel able.   You may have a slight fever.  Call the doctor if your fever is over 100 F (37.7 C) (taken under the tongue) or lasts longer than 24 hours.  You may have a dry mouth, a sore throat, muscle aches or trouble sleeping. These should go away after 24 hours.  Do not make important or legal decisions.   It is recommended to avoid smoking.   Tips for taking pain medications  To get the best pain relief possible, remember these points:  Take pain medications as directed, before pain becomes severe.  Pain medication can upset your stomach: taking it with food may help.  Constipation is a common side effect of pain medication. Drink plenty of  fluids.  Eat foods high in fiber. Take a stool softener if recommended by your doctor or pharmacist.  Do not drink alcohol, drive or operate machinery while taking pain medications.  Ask about other ways to control pain, such as with heat, ice or relaxation.    Tylenol/Acetaminophen Consumption    If you feel your pain relief is insufficient, you may take Tylenol/Acetaminophen in addition to your  narcotic pain medication.   Be careful not to exceed 4,000 mg of Tylenol/Acetaminophen in a 24 hour period from all sources.  If you are taking extra strength Tylenol/acetaminophen (500 mg), the maximum dose is 8 tablets in 24 hours.  If you are taking regular strength acetaminophen (325 mg), the maximum dose is 12 tablets in 24 hours.    Call a doctor for any of the following:  Signs of infection (fever, growing tenderness at the surgery site, a large amount of drainage or bleeding, severe pain, foul-smelling drainage, redness, swelling).  It has been over 8 to 10 hours since surgery and you are still not able to urinate (pass water).  Headache for over 24 hours.  Numbness, tingling or weakness the day after surgery (if you had spinal anesthesia).  Signs of Covid-19 infection (temperature over 100 degrees, shortness of breath, cough, loss of taste/smell, generalized body aches, persistent headache, chills, sore throat, nausea/vomiting/diarrhea)  Your doctor is:       Dr. Salvador Gonzalez, Plastic Surgery: [phone]               Or dial [phone] and ask for the resident on call for:  Plastics  For emergency care, call the:  [place] [facility]:  [phone] (TTY for hearing impaired: [phone])                Plastic Surgery Discharge Instructions    Patient has been treated for bilateral lower extremity lipedema with Dr. Gonzalez on 8/13/2024.     Care: Please keep the dressing in place, clean and dry. If you have steri-strips and/or skin adhesive that was used, these will fall off on their own. You can shower in 36-48 hours. Allow the water and soap to run over the incisions and gently either air or pad dry.  Please place a lower extremity compression garment or tighter leggings.  Please wear lower extremity compression for at least the next 4 weeks is much as possible, then we will transition to nighttime only compression for an additional month.    Medications: You can take Ibuprofen  400-800 mg and Tylenol 650 mg for pain relief. Please take each every 6 hours, and for optimal pain relief - please stagger the medications so that you are taking one or the other every 3 hours. If you had a nerve block, the effects may last 8-12 hours. If you have been prescribed additional pain medications, please take as instructed and as needed. If you are taking additional pain medications, please do not exceed 4000 mg of Tylenol daily from all sources. Also, if you are taking narcotic medications, please do not operate heavy machinery or drive. If you have been prescribed antibiotics, please also take as instructed.     Diet: Please hydrate liberally. Start with clear liquids and slow down if nauseated. Advance the diet as tolerated.  Please avoid high salt in the diet.    Weight-bearing/Activities: No strenuous activities and do not raise your heart rate above 100 bpm for the first weeks.  Please ensure that you are walking to prevent blood clots forming in the deep veins of the legs.    ER Instructions: Please call the office and/or consider return to the ER if you experience worsening pain not relieved by medications, increased swelling, redness or high fevers >101F or if there are unexpected problems like shortness of breath.    Post-op follow-up: Clinic in 10-14 days with Dr. Gonzalez or his PA at the [facility]    Salvador Gonzalez MD, PhD

## 2024-08-13 NOTE — BRIEF OP NOTE
[facility] [place]    Brief Operative Note    Pre-operative diagnosis: Lipedema [R60.9]  Post-operative diagnosis Same as pre-operative diagnosis    Procedure: BILATERAL circumferential lower extremity liposuction for lipedema, Bilateral - Leg    Surgeon: Surgeons and Role:     * Salvador Gonzalez MD - Primary     * Gely Veronica MD - Assisting  Anesthesia: General   Estimated Blood Loss: 50 ml    Drains: None  Specimens: * No specimens in log *  Findings:   4700 ml removed in liposuction aspirate from bilateral lower extremities .  Complications: None.  Implants: * No implants in log *

## 2024-08-13 NOTE — PROGRESS NOTES
PRS    No changes in history or exam. Medically optimized.     OR today for BILATERAL circumferential lower extremity liposuction.    Discussed the risks of surgery, including but not limited to: infection, bleeding, pain, poor scarring, prolonged or worse swelling, incomplete relief of symptoms, injury to deeper structures, neuroma formation, foot drop, contour irregularity, asymmetries, suboptimal aesthetic result, anesthesia-related complication, DVT/PE, death. Despite these risks, patient consents to BILATERAL circumferential lower extremity liposuction for lipedema. All questions answered.     Salvador Gonzalez MD, PhD

## 2024-08-13 NOTE — ANESTHESIA PROCEDURE NOTES
Airway       Patient location during procedure: OR       Procedure Start/Stop Times: 8/13/2024 11:45 AM  Staff -        CRNA: Sujey Montgomery APRN CRNA       Performed By: CRNAIndications and Patient Condition       Indications for airway management: leonarda-procedural       Induction type:intravenous      Final Airway Details       Final airway type: endotracheal airway       Successful airway: ETT - single  Endotracheal Airway Details        ETT size (mm): 7.0       Cuffed: yes       Successful intubation technique: direct laryngoscopy       DL Blade Type: MAC 3       Grade View of Cords: 1       Adjucts: stylet       Position: Right       Measured from: gums/teeth       Secured at (cm): 21       Bite block used: None    Post intubation assessment        Placement verified by: capnometry, equal breath sounds and chest rise        Number of attempts at approach: 1       Number of other approaches attempted: 0       Secured with: tape       Ease of procedure: easy       Dentition: Intact and Unchanged    Medication(s) Administered   Medication Administration Time: 8/13/2024 11:45 AM

## 2024-08-13 NOTE — ANESTHESIA POSTPROCEDURE EVALUATION
Patient: Waleska Taylor    Procedure: Procedure(s):  BILATERAL circumferential lower extremity liposuction for lipedema       Anesthesia Type:  General    Note:  Disposition: Outpatient   Postop Pain Control: Uneventful            Sign Out: Well controlled pain   PONV: No   Neuro/Psych: Uneventful            Sign Out: Acceptable/Baseline neuro status   Airway/Respiratory: Uneventful            Sign Out: Acceptable/Baseline resp. status   CV/Hemodynamics: Uneventful            Sign Out: Acceptable CV status; No obvious hypovolemia; No obvious fluid overload   Other NRE:    DID A NON-ROUTINE EVENT OCCUR? No           Last vitals:  Vitals Value Taken Time   /66 08/13/24 1550   Temp 36  C (96.8  F) 08/13/24 1550   Pulse 55 08/13/24 1554   Resp 13 08/13/24 1554   SpO2 89 % 08/13/24 1554   Vitals shown include unfiled device data.    Electronically Signed By: Yaron Fernández MD  August 13, 2024  4:14 PM

## 2024-08-19 ENCOUNTER — MYC REFILL (OUTPATIENT)
Dept: SURGERY | Facility: AMBULATORY SURGERY CENTER | Age: 64
End: 2024-08-19
Payer: COMMERCIAL

## 2024-08-19 DIAGNOSIS — R60.9 LIPEDEMA: ICD-10-CM

## 2024-08-19 RX ORDER — OXYCODONE HYDROCHLORIDE 5 MG/1
5 TABLET ORAL EVERY 6 HOURS PRN
Qty: 12 TABLET | Refills: 0 | Status: SHIPPED | OUTPATIENT
Start: 2024-08-19

## 2024-08-19 RX ORDER — METHOCARBAMOL 500 MG/1
500 TABLET, FILM COATED ORAL 4 TIMES DAILY
Qty: 12 TABLET | Refills: 0 | Status: SHIPPED | OUTPATIENT
Start: 2024-08-19

## 2024-08-21 ENCOUNTER — MYC MEDICAL ADVICE (OUTPATIENT)
Dept: PLASTIC SURGERY | Facility: CLINIC | Age: 64
End: 2024-08-21
Payer: COMMERCIAL

## 2024-08-21 DIAGNOSIS — R60.9 LIPEDEMA: Primary | ICD-10-CM

## 2024-08-22 RX ORDER — HYDROXYZINE HYDROCHLORIDE 25 MG/1
25 TABLET, FILM COATED ORAL 3 TIMES DAILY PRN
Qty: 30 TABLET | Refills: 0 | Status: SHIPPED | OUTPATIENT
Start: 2024-08-22

## 2024-08-22 NOTE — PROGRESS NOTES
"Plastic Surgery Outpatient Visit    ID: Waleska Taylor is a 64 year old female s/p BILATERAL circumferential lower extremity liposuction for lipedema - Bilateral 8/13/2024 with Dr. Gonzalez.     S: overall Waleksa is not doing that well, she has been in pain since surgery. She was anticipating an easier recovery and feels upset that she is still in pain. Complains of some tingling and burning pain to feet as well as pressure sensation at times and generalized soreness from bruising. She has been taking tylenol/advil as prescribed. She has run out of oxy and was trying to wean to return to work but is having too much pain. Tried atarax but it didn't help. Was seen in ED 2 days ago and had negative dopplers for DVT and given a few oxycodone. She is using compression leggings or acewraps, wrapping from thigh down but then tend to slide down. Legs are less swollen in the mornings.     O:  BP (!) 145/78 (BP Location: Left arm, Patient Position: Sitting, Cuff Size: Adult Regular)   Pulse 71   Temp 97.8  F (36.6  C) (Oral)   Ht 1.676 m (5' 6\")   Wt 90.1 kg (198 lb 11.2 oz)   LMP  (LMP Unknown)   SpO2 99%   BMI 32.07 kg/m     General: NAD, tearful  Legs: bilateral legs with significant ecchymosis in different stages of resolution. Overall legs are soft. Swelling worse to distal extremities. Canula sites with scabs. Moving ankle/toes without issue. DP pulses intact bilaterally.     A/P:  -patient stable but with persistent pain  -Rx for oxy QID x1 week given. Discussed bowel regimen. Start gabapentin, taper up to 300TID.  -compression at all time for weeks 0-4 post op, then at night only for weeks 4-8. Ok to use compression leggings or ace wraps, wrap starting at foot and work up the leg.  -low salt diet  -continue with elevating extremities above the heart when not up walking around. Continue compression boots.  -ok to try topical lidocaine patches OTC  -work note provided  -RTC 1 week, call/message sooner with " concerns.    Bonnie Montoya PA-C  Plastic and Reconstructive Surgery    30 minutes spent on the date of the encounter doing chart review, history and physical, dressing changes, documentation and further activity as noted above.

## 2024-08-27 ENCOUNTER — OFFICE VISIT (OUTPATIENT)
Dept: PLASTIC SURGERY | Facility: CLINIC | Age: 64
End: 2024-08-27
Payer: COMMERCIAL

## 2024-08-27 VITALS
BODY MASS INDEX: 31.93 KG/M2 | WEIGHT: 198.7 LBS | HEIGHT: 66 IN | DIASTOLIC BLOOD PRESSURE: 78 MMHG | HEART RATE: 71 BPM | OXYGEN SATURATION: 99 % | SYSTOLIC BLOOD PRESSURE: 145 MMHG | TEMPERATURE: 97.8 F

## 2024-08-27 DIAGNOSIS — R60.9 LIPEDEMA: Primary | ICD-10-CM

## 2024-08-27 PROCEDURE — 99024 POSTOP FOLLOW-UP VISIT: CPT | Performed by: PHYSICIAN ASSISTANT

## 2024-08-27 RX ORDER — OXYCODONE HYDROCHLORIDE 5 MG/1
5 TABLET ORAL EVERY 6 HOURS PRN
Qty: 30 TABLET | Refills: 0 | Status: SHIPPED | OUTPATIENT
Start: 2024-08-27 | End: 2024-09-04

## 2024-08-27 RX ORDER — GABAPENTIN 300 MG/1
CAPSULE ORAL
Qty: 69 CAPSULE | Refills: 0 | Status: SHIPPED | OUTPATIENT
Start: 2024-08-27 | End: 2024-09-12

## 2024-08-27 ASSESSMENT — PAIN SCALES - GENERAL: PAINLEVEL: SEVERE PAIN (7)

## 2024-08-27 NOTE — LETTER
August 27, 2024    RE:  Waleska Taylor                              [address] [address]      To Whom It May Concern:    This letter is written to document that Waleska Taylor is under the medical care of Dr. Salvador Gonzalez of the Division of Reconstructive and Plastic Surgery at the [facility].    Waleska recently underwent surgery on 08/13/2024 with Dr. Gonzalez. At this time, Waleska is still recovering from surgery and cannot resume working. Her recovery is being monitored and her potential return to work will be reevaluated at her follow-up appointment with Dr. Gonzalez on 09/04/2024.    Please take the above information into consideration when determining Waleska's future work schedule. If there are questions or concerns regarding the plan of care, please contact the Reconstructive and Plastic Surgery Clinic at [phone].    Sincerely,    Bonnie Montoya PA-C  Division of Reconstructive and Plastic Surgery  Department of Surgery

## 2024-08-27 NOTE — NURSING NOTE
"Chief Complaint   Patient presents with    Surgical Followup     2 week post-op, DOS 8/13/24.       Vitals:    08/27/24 0859   BP: (!) 145/78   BP Location: Left arm   Patient Position: Sitting   Cuff Size: Adult Regular   Pulse: 71   Temp: 97.8  F (36.6  C)   TempSrc: Oral   SpO2: 99%   Weight: 198 lb 11.2 oz   Height: 5' 6\"       Body mass index is 32.07 kg/m .    Patient reports severe bilateral lower extremity pain (7/10).    Merlin Larios, EMT    "

## 2024-08-27 NOTE — LETTER
"8/27/2024       RE: Waleska Taylor  [address] [address]     Dear Colleague,    Thank you for referring your patient, Waleska Taylor, to the [facility] PLASTIC AND RECONSTRU[facility] SURGERY CLINIC [place] at [facility]. Please see a copy of my visit note below.    Plastic Surgery Outpatient Visit    ID: Waleska Taylor is a 64 year old female s/p BILATERAL circumferential lower extremity liposuction for lipedema - Bilateral 8/13/2024 with Dr. Gonzalez.     S: overall Waleska is not doing that well, she has been in pain since surgery. She was anticipating an easier recovery and feels upset that she is still in pain. Complains of some tingling and burning pain to feet as well as pressure sensation at times and generalized soreness from bruising. She has been taking tylenol/advil as prescribed. She has run out of oxy and was trying to wean to return to work but is having too much pain. Tried atarax but it didn't help. Was seen in ED 2 days ago and had negative dopplers for DVT and given a few oxycodone. She is using compression leggings or acewraps, wrapping from thigh down but then tend to slide down. Legs are less swollen in the mornings.     O:  BP (!) 145/78 (BP Location: Left arm, Patient Position: Sitting, Cuff Size: Adult Regular)   Pulse 71   Temp 97.8  F (36.6  C) (Oral)   Ht 1.676 m (5' 6\")   Wt 90.1 kg (198 lb 11.2 oz)   LMP  (LMP Unknown)   SpO2 99%   BMI 32.07 kg/m     General: NAD, tearful  Legs: bilateral legs with significant ecchymosis in different stages of resolution. Overall legs are soft. Swelling worse to distal extremities. Canula sites with scabs. Moving ankle/toes without issue. DP pulses intact bilaterally.     A/P:  -patient stable but with persistent pain  -Rx for oxy QID x1 week given. Discussed bowel regimen. Start gabapentin, taper up to 300TID.  -compression at all time for weeks 0-4 post op, then " at night only for weeks 4-8. Ok to use compression leggings or ace wraps, wrap starting at foot and work up the leg.  -low salt diet  -continue with elevating extremities above the heart when not up walking around. Continue compression boots.  -ok to try topical lidocaine patches OTC  -work note provided  -RTC 1 week, call/message sooner with concerns.    Bonnie Montoya PA-C  Plastic and Reconstructive Surgery    30 minutes spent on the date of the encounter doing chart review, history and physical, dressing changes, documentation and further activity as noted above.      Again, thank you for allowing me to participate in the care of your patient.      Sincerely,    Bonnie Montoya PA-C

## 2024-09-04 ENCOUNTER — OFFICE VISIT (OUTPATIENT)
Dept: PLASTIC SURGERY | Facility: CLINIC | Age: 64
End: 2024-09-04
Payer: COMMERCIAL

## 2024-09-04 VITALS
BODY MASS INDEX: 30.7 KG/M2 | SYSTOLIC BLOOD PRESSURE: 154 MMHG | DIASTOLIC BLOOD PRESSURE: 83 MMHG | WEIGHT: 191 LBS | OXYGEN SATURATION: 98 % | HEIGHT: 66 IN | HEART RATE: 69 BPM

## 2024-09-04 DIAGNOSIS — R60.9 LIPEDEMA: Primary | ICD-10-CM

## 2024-09-04 PROCEDURE — 99212 OFFICE O/P EST SF 10 MIN: CPT | Performed by: STUDENT IN AN ORGANIZED HEALTH CARE EDUCATION/TRAINING PROGRAM

## 2024-09-04 ASSESSMENT — PAIN SCALES - GENERAL: PAINLEVEL: MODERATE PAIN (4)

## 2024-09-04 NOTE — LETTER
9/4/2024       RE: Waleska Taylor  [address] [address]     Dear Colleague,    Thank you for referring your patient, Waleska Taylor, to the [facility] PLASTIC AND RECONSTRUCTIVE SURGERY CLINIC [place] [facility]. Please see a copy of my visit note below.    PRS    Doing well.  Had quite a bit of discomfort, that was relieved by gabapentin.  The pain is gotten much better.  She continues to have swelling in the bilateral legs.  She wears the compression garment.  Patient is urinating with no issues.    On exam, all small liposuction incisions are intact with no wounds or signs of infection.  There is some expected swelling in the bilateral lower extremities, but no calf tenderness, and the swelling is symmetric.  The bruising has improved.    A/P: 64-year-old female 3+ weeks status post bilateral circumferential lower extremity liposuction for lipedema    -Reiterated plan for treatment.  Patient should continue to wear the compression garment at all times for an additional week.  After this, patient can transition to nighttime only use for an additional month.  During the day, my recommendation would be that the patient still wear leggings and elevate the lower extremities to help reduce swelling.  In general, swelling in the lower extremities after liposuction may persist for and is expected to last for at least 3-4 months.  All swelling does not resolve for even up to a year.  -Patient should move the calves and walk or ambulate especially if deciding to travel within the next several months.  This is important to help prevent deep venous thrombosis.  -Wean the gabapentin  -Return to clinic at 3 months postoperatively    Salvador Gonzalez MD, PhD      Again, thank you for allowing me to participate in the care of your patient.      Sincerely,    Salvador Gonzalez MD

## 2024-09-04 NOTE — NURSING NOTE
"Chief Complaint   Patient presents with    Surgical Followup     3 week post-op, DOS 8/13/24.       Vitals:    09/04/24 1305   BP: (!) 154/83   BP Location: Left arm   Patient Position: Chair   Cuff Size: Adult Regular   Pulse: 69   SpO2: 98%   Weight: 86.6 kg (191 lb)   Height: 1.676 m (5' 6\")       Body mass index is 30.83 kg/m .      Vicky Healy LPN    "

## 2024-09-05 NOTE — PROGRESS NOTES
PRS    Doing well.  Had quite a bit of discomfort, that was relieved by gabapentin.  The pain is gotten much better.  She continues to have swelling in the bilateral legs.  She wears the compression garment.  Patient is urinating with no issues.    On exam, all small liposuction incisions are intact with no wounds or signs of infection.  There is some expected swelling in the bilateral lower extremities, but no calf tenderness, and the swelling is symmetric.  The bruising has improved.    A/P: 64-year-old female 3+ weeks status post bilateral circumferential lower extremity liposuction for lipedema    -Reiterated plan for treatment.  Patient should continue to wear the compression garment at all times for an additional week.  After this, patient can transition to nighttime only use for an additional month.  During the day, my recommendation would be that the patient still wear leggings and elevate the lower extremities to help reduce swelling.  In general, swelling in the lower extremities after liposuction may persist for and is expected to last for at least 3-4 months.  All swelling does not resolve for even up to a year.  -Patient should move the calves and walk or ambulate especially if deciding to travel within the next several months.  This is important to help prevent deep venous thrombosis.  -Wean the gabapentin  -Return to clinic at 3 months postoperatively    Salvador Gonzalez MD, PhD

## 2024-09-12 ENCOUNTER — MYC REFILL (OUTPATIENT)
Dept: PLASTIC SURGERY | Facility: CLINIC | Age: 64
End: 2024-09-12
Payer: COMMERCIAL

## 2024-09-12 DIAGNOSIS — R60.9 LIPEDEMA: ICD-10-CM

## 2024-09-12 RX ORDER — GABAPENTIN 300 MG/1
300 CAPSULE ORAL 3 TIMES DAILY
Qty: 63 CAPSULE | Refills: 0 | Status: SHIPPED | OUTPATIENT
Start: 2024-09-12 | End: 2024-10-03

## 2025-01-22 ENCOUNTER — OFFICE VISIT (OUTPATIENT)
Dept: PLASTIC SURGERY | Facility: CLINIC | Age: 65
End: 2025-01-22
Payer: COMMERCIAL

## 2025-01-22 VITALS
DIASTOLIC BLOOD PRESSURE: 65 MMHG | OXYGEN SATURATION: 98 % | HEART RATE: 65 BPM | SYSTOLIC BLOOD PRESSURE: 117 MMHG | HEIGHT: 66 IN | BODY MASS INDEX: 30.83 KG/M2

## 2025-01-22 DIAGNOSIS — R60.9 LIPEDEMA: Primary | ICD-10-CM

## 2025-01-22 RX ORDER — CEFAZOLIN SODIUM 2 G/50ML
2 SOLUTION INTRAVENOUS SEE ADMIN INSTRUCTIONS
OUTPATIENT
Start: 2025-01-22

## 2025-01-22 RX ORDER — CEFAZOLIN SODIUM 2 G/50ML
2 SOLUTION INTRAVENOUS
OUTPATIENT
Start: 2025-01-22

## 2025-01-22 ASSESSMENT — PAIN SCALES - GENERAL: PAINLEVEL_OUTOF10: NO PAIN (0)

## 2025-01-22 NOTE — LETTER
1/22/2025       RE: Waleska Taylor  [address] [address]     Dear Colleague,    Thank you for referring your patient, Waleska Taylor, to the [facility] [place] at [facility]. Please see a copy of my visit note below.    PRS    Patient returns.  She is had improvement after the liposuction.  She does have some residual heaviness sensation to the bilateral lower extremities.  She inquires about a second round of liposuction.    On exam, bilateral lower extremity lipodystrophy with lipedema.    A/P: 64-year-old female presenting with bilateral lower extremity lipedema    -Discussed options for management.  Since the patient has had improvement following liposuction, but also has residual symptoms, it would not be unreasonable to plan for another case of liposuction.  It is unclear whether her insurance would approve this, but it may meet medical necessity criteria on the basis that the symptoms are attributable to lipedema.  Furthermore, it is helpful to know that liposuction has helped her and additional liposuction may further improve her symptoms.  -Discussed the risks of surgery, including but not limited to: Infection, bleeding, pain, poor scarring, wound healing issues, contour irregularity, incomplete relief of symptoms, worsening symptoms, injury to nerves, neuroma formation, DVT, PE, need for additional revision surgery, anesthesia related complication, death.  Despite these risks, patient consents to and would like to proceed with bilateral lower extremity liposuction.  -Case request will be placed  -Encouraged the patient to reach out to her insurance regarding their policy statement on treatment of lipedema.  We will also proceed with a prior authorization, but we did reiterate that it may or may not be approved.  -A total of 20 minutes was devoted to review of chart, direct  face-to-face patient counseling and documentation during and on the day of this encounter, exclusive of any procedure performed.    Salvador Gonzalez MD, PhD, FACS        Again, thank you for allowing me to participate in the care of your patient.      Sincerely,    Salvador Gonzalez MD

## 2025-01-22 NOTE — PROGRESS NOTES
PRS    Patient returns.  She is had improvement after the liposuction.  She does have some residual heaviness sensation to the bilateral lower extremities.  She inquires about a second round of liposuction.    On exam, bilateral lower extremity lipodystrophy with lipedema.    A/P: 64-year-old female presenting with bilateral lower extremity lipedema    -Discussed options for management.  Since the patient has had improvement following liposuction, but also has residual symptoms, it would not be unreasonable to plan for another case of liposuction.  It is unclear whether her insurance would approve this, but it may meet medical necessity criteria on the basis that the symptoms are attributable to lipedema.  Furthermore, it is helpful to know that liposuction has helped her and additional liposuction may further improve her symptoms.  -Discussed the risks of surgery, including but not limited to: Infection, bleeding, pain, poor scarring, wound healing issues, contour irregularity, incomplete relief of symptoms, worsening symptoms, injury to nerves, neuroma formation, DVT, PE, need for additional revision surgery, anesthesia related complication, death.  Despite these risks, patient consents to and would like to proceed with bilateral lower extremity liposuction.  -Case request will be placed  -Encouraged the patient to reach out to her insurance regarding their policy statement on treatment of lipedema.  We will also proceed with a prior authorization, but we did reiterate that it may or may not be approved.  -A total of 20 minutes was devoted to review of chart, direct face-to-face patient counseling and documentation during and on the day of this encounter, exclusive of any procedure performed.    Salvador Gonzalez MD, PhD, FACS

## 2025-01-22 NOTE — NURSING NOTE
"Chief Complaint   Patient presents with    RECHECK     Return - lipedema surgery DIS 8/13/24, review results and discuss additional lipo       Vitals:    01/22/25 1505   BP: 117/65   BP Location: Left arm   Patient Position: Sitting   Cuff Size: Adult Regular   Pulse: 65   SpO2: 98%   Height: 5' 6\"       Body mass index is 30.83 kg/m .                          Eloise Calloway, EMT    "

## 2025-01-23 ENCOUNTER — TELEPHONE (OUTPATIENT)
Dept: PLASTIC SURGERY | Facility: CLINIC | Age: 65
End: 2025-01-23
Payer: COMMERCIAL

## 2025-01-23 NOTE — TELEPHONE ENCOUNTER
Attempted to call patient regarding scheduling surgery with Dr. Gonzalez    Was unable to leave voicemail due to no voicemail option      Shruthi Dent on 1/23/2025 at 2:44 PM

## 2025-01-23 NOTE — PATIENT INSTRUCTIONS
Follow up:    Please call with any questions or concerns regarding your clinic visit today.    It is a pleasure being involved in your health care.    Contacts post-consultation depending on your need:    [facility]IR[facility] [phone]    Radiology Appointments [phone]    Schedule Clinic Appointments [phone]    Clinic Fax Number [phone]    Surgery Scheduling [phone]    Please be aware that coverage of your surgery is subject to the terms and limitations of your health insurance plan. You can call your member services at your health plan with any benefit or coverage questions.  As part of our process, if your insurance company denies our request for prior authorization, we will be required to cancel your scheduled procedure and surgery date. However, if you wish to proceed with the surgery, you have the option to pay out of pocket. In this case, we will provide you with a cosmetic quote for the cost of the procedure. If you elect to pay the cosmetic quote, we will keep your surgery as scheduled and our  will call to collect payment. We understand that navigating insurance and payment options can be complex when it comes to your health care. If you have any questions or would like to discuss, please do not hesitate to contact our office at [phone].     My Chart is available 24 hours a day and is a secure way to access your records and communicate with your care team.  I strongly recommend signing up if you haven't already done so, if you are comfortable with computers.  If you would like to inquire about this or are having problems with My Chart access, you may call [phone] or go online at [email or web address].  Please allow at least 24 hours for a response and extra time on weekends and Holidays.

## 2025-01-24 PROBLEM — R60.9 LIPEDEMA: Status: ACTIVE | Noted: 2024-03-27

## 2025-02-01 ENCOUNTER — HEALTH MAINTENANCE LETTER (OUTPATIENT)
Age: 65
End: 2025-02-01

## 2025-03-12 ENCOUNTER — TELEPHONE (OUTPATIENT)
Dept: PLASTIC SURGERY | Facility: CLINIC | Age: 65
End: 2025-03-12
Payer: COMMERCIAL

## 2025-03-12 NOTE — TELEPHONE ENCOUNTER
Received call from Waleska    Rescheduled surgery due to her grandson graduating     Waleska is requesting a sooner date    Patient was told there is nothing sooner, she is still on the cancellation list however    Patient made multiple statements requesting writer to to ask Dr. Gonzalez and his nurse to help get her in sooner, also stated Dr. Gonzalez doesn't need that long for this surgery, and then stated she will contact Luke because Luke can talk to Dr. Gonzalez     Had a andressa discussion with Waleska that I can notify Dr. Gonzalez and Luke of her request, but there is nothing they can do. Dr. Gonzalez cannot cancel clinic or another patient for this, there are other patients who are in a similar situation    Explained multiple times there is nothing we can do in order to get surgery rescheduled sooner unless someone cancels and Dr. Gonzalez requested 4 hours, the OR will factor in the anesthesia/positioning, it will not be shortened     She states she doesn't want to be warm and have surgery in the summer    Further stated she is retiring in August and needs surgery beforehand    Will pass along to team, she was told she is on the cancellation list multiple times throughout call duration    Date of Surgery: 7/7    Estimated Arrival time Discussed with Patient:  No    Location of surgery: [facility]    Post-operative Appointment w/JOSTIN or Surgeon: Bonnie Montoya PA-C 7/18 at 11:40 AM      Shruthi Dent on 3/12/2025 at 12:58 PM

## 2025-03-12 NOTE — TELEPHONE ENCOUNTER
Received voicemail from: Waleska Galeano states she would like to reschedule surgery with Dr. Gonzalez     Left voicemail for patient, provided contact number to discuss    Shruthi Dent on 3/12/2025 at 11:50 AM

## (undated) DEVICE — GLOVE BIOGEL PI MICRO SZ 7.5 48575

## (undated) DEVICE — BNDG ELASTIC 6" DBL LENGTH UNSTERILE 6611-16

## (undated) DEVICE — SUCTION CANISTER MEDIVAC LINER 1500ML W/LID 65651-515

## (undated) DEVICE — SU MONOCRYL 4-0 P-3 18" UND Y494G

## (undated) DEVICE — TUBING ASPIRATING BYRON 3/8"X12' PT-5558

## (undated) DEVICE — DRAPE SHEET REV FOLD 3/4 9349

## (undated) DEVICE — DRAPE IOBAN INCISE 10X20CM 6635

## (undated) DEVICE — BLADE KNIFE SURG 11 371111

## (undated) DEVICE — SU PLAIN FAST ABSORB 5-0 PC-1 18" 1915G

## (undated) DEVICE — SU DERMABOND ADVANCED .7ML DNX12

## (undated) DEVICE — DRAPE IOBAN INCISE 23X17" 6650EZ

## (undated) DEVICE — DRAPE STOCKINETTE IMPERVIOUS 10" 21048

## (undated) DEVICE — PREP CHLORAPREP 26ML TINTED HI-LITE ORANGE 930815

## (undated) DEVICE — LINEN TOWEL PACK X5 5464

## (undated) DEVICE — PACK MINOR CUSTOM ASC

## (undated) DEVICE — CATH TRAY FOLEY SURESTEP 16FR W/DRAIN BAG LF A300416A

## (undated) DEVICE — PAD CHUX UNDERPAD 30X36" P3036C

## (undated) DEVICE — SOL RINGERS LACTATED 1000ML BAG 2B2324X

## (undated) DEVICE — DRAPE U SPLIT 74X120" 29440

## (undated) DEVICE — SOL NACL 0.9% IRRIG 500ML BOTTLE 2F7123

## (undated) DEVICE — TUBING IRRIGATION LIPOSUCTION 13' MRI COMP CG-INF-T

## (undated) DEVICE — Device

## (undated) DEVICE — SOL WATER IRRIG 500ML BOTTLE 2F7113

## (undated) RX ORDER — HYDROMORPHONE HYDROCHLORIDE 1 MG/ML
INJECTION, SOLUTION INTRAMUSCULAR; INTRAVENOUS; SUBCUTANEOUS
Status: DISPENSED
Start: 2024-08-13

## (undated) RX ORDER — PROPOFOL 10 MG/ML
INJECTION, EMULSION INTRAVENOUS
Status: DISPENSED
Start: 2024-08-13

## (undated) RX ORDER — OXYCODONE HYDROCHLORIDE 5 MG/1
TABLET ORAL
Status: DISPENSED
Start: 2024-08-13

## (undated) RX ORDER — EPINEPHRINE 1 MG/ML
INJECTION, SOLUTION INTRAMUSCULAR; SUBCUTANEOUS
Status: DISPENSED
Start: 2024-08-13

## (undated) RX ORDER — FENTANYL CITRATE 50 UG/ML
INJECTION, SOLUTION INTRAMUSCULAR; INTRAVENOUS
Status: DISPENSED
Start: 2024-08-13

## (undated) RX ORDER — ACETAMINOPHEN 325 MG/1
TABLET ORAL
Status: DISPENSED
Start: 2024-08-13

## (undated) RX ORDER — ENOXAPARIN SODIUM 100 MG/ML
INJECTION SUBCUTANEOUS
Status: DISPENSED
Start: 2024-08-13

## (undated) RX ORDER — ONDANSETRON 2 MG/ML
INJECTION INTRAMUSCULAR; INTRAVENOUS
Status: DISPENSED
Start: 2024-08-13

## (undated) RX ORDER — DEXAMETHASONE SODIUM PHOSPHATE 4 MG/ML
INJECTION, SOLUTION INTRA-ARTICULAR; INTRALESIONAL; INTRAMUSCULAR; INTRAVENOUS; SOFT TISSUE
Status: DISPENSED
Start: 2024-08-13

## (undated) RX ORDER — CEFAZOLIN SODIUM 2 G/50ML
SOLUTION INTRAVENOUS
Status: DISPENSED
Start: 2024-08-13